# Patient Record
Sex: FEMALE | Race: WHITE | Employment: PART TIME | URBAN - METROPOLITAN AREA
[De-identification: names, ages, dates, MRNs, and addresses within clinical notes are randomized per-mention and may not be internally consistent; named-entity substitution may affect disease eponyms.]

---

## 2023-03-17 ENCOUNTER — HOSPITAL ENCOUNTER (EMERGENCY)
Age: 48
Discharge: HOME | End: 2023-03-17
Attending: EMERGENCY MEDICINE
Payer: COMMERCIAL

## 2023-03-17 ENCOUNTER — APPOINTMENT (OUTPATIENT)
Dept: CT IMAGING | Age: 48
End: 2023-03-17
Attending: EMERGENCY MEDICINE
Payer: COMMERCIAL

## 2023-03-17 VITALS
DIASTOLIC BLOOD PRESSURE: 68 MMHG | HEIGHT: 64 IN | TEMPERATURE: 98.1 F | BODY MASS INDEX: 23.9 KG/M2 | HEART RATE: 98 BPM | OXYGEN SATURATION: 98 % | SYSTOLIC BLOOD PRESSURE: 126 MMHG | WEIGHT: 140 LBS | RESPIRATION RATE: 18 BRPM

## 2023-03-17 DIAGNOSIS — N23 RENAL COLIC: ICD-10-CM

## 2023-03-17 DIAGNOSIS — N20.1 URETEROLITHIASIS: Primary | ICD-10-CM

## 2023-03-17 LAB
APPEARANCE UR: ABNORMAL
BACTERIA URNS QL MICRO: ABNORMAL /HPF
BILIRUB UR QL: NEGATIVE
CAOX CRY URNS QL MICRO: ABNORMAL
COLOR UR: ABNORMAL
EPITH CASTS URNS QL MICRO: ABNORMAL /LPF
GLUCOSE UR STRIP.AUTO-MCNC: NEGATIVE MG/DL
HGB UR QL STRIP: ABNORMAL
KETONES UR QL STRIP.AUTO: NEGATIVE MG/DL
LEUKOCYTE ESTERASE UR QL STRIP.AUTO: NEGATIVE
MUCOUS THREADS URNS QL MICRO: ABNORMAL /LPF
NITRITE UR QL STRIP.AUTO: NEGATIVE
PH UR STRIP: 6 (ref 5–8)
PROT UR STRIP-MCNC: ABNORMAL MG/DL
RBC #/AREA URNS HPF: ABNORMAL /HPF (ref 0–5)
SP GR UR REFRACTOMETRY: 1.02 (ref 1–1.03)
UROBILINOGEN UR QL STRIP.AUTO: 0.2 EU/DL (ref 0.2–1)
WBC URNS QL MICRO: ABNORMAL /HPF (ref 0–4)

## 2023-03-17 PROCEDURE — 74176 CT ABD & PELVIS W/O CONTRAST: CPT

## 2023-03-17 PROCEDURE — 81001 URINALYSIS AUTO W/SCOPE: CPT

## 2023-03-17 PROCEDURE — 96372 THER/PROPH/DIAG INJ SC/IM: CPT

## 2023-03-17 PROCEDURE — 74011250636 HC RX REV CODE- 250/636: Performed by: EMERGENCY MEDICINE

## 2023-03-17 PROCEDURE — 99284 EMERGENCY DEPT VISIT MOD MDM: CPT

## 2023-03-17 RX ORDER — ONDANSETRON 4 MG/1
4 TABLET, ORALLY DISINTEGRATING ORAL
Qty: 12 TABLET | Refills: 0 | Status: SHIPPED | OUTPATIENT
Start: 2023-03-17

## 2023-03-17 RX ORDER — HYDROCODONE BITARTRATE AND ACETAMINOPHEN 5; 325 MG/1; MG/1
1 TABLET ORAL
Qty: 8 TABLET | Refills: 0 | Status: SHIPPED | OUTPATIENT
Start: 2023-03-17 | End: 2023-03-20

## 2023-03-17 RX ORDER — TAMSULOSIN HYDROCHLORIDE 0.4 MG/1
0.4 CAPSULE ORAL DAILY
Qty: 15 CAPSULE | Refills: 0 | Status: SHIPPED | OUTPATIENT
Start: 2023-03-17 | End: 2023-04-01

## 2023-03-17 RX ORDER — KETOROLAC TROMETHAMINE 10 MG/1
10 TABLET, FILM COATED ORAL
Qty: 10 TABLET | Refills: 0 | Status: SHIPPED | OUTPATIENT
Start: 2023-03-17

## 2023-03-17 RX ORDER — ONDANSETRON 4 MG/1
4 TABLET, ORALLY DISINTEGRATING ORAL
Qty: 12 TABLET | Refills: 0 | Status: SHIPPED | OUTPATIENT
Start: 2023-03-17 | End: 2023-03-17 | Stop reason: SDUPTHER

## 2023-03-17 RX ORDER — KETOROLAC TROMETHAMINE 10 MG/1
10 TABLET, FILM COATED ORAL
Qty: 10 TABLET | Refills: 0 | Status: SHIPPED | OUTPATIENT
Start: 2023-03-17 | End: 2023-03-17 | Stop reason: SDUPTHER

## 2023-03-17 RX ORDER — TAMSULOSIN HYDROCHLORIDE 0.4 MG/1
0.4 CAPSULE ORAL DAILY
Qty: 15 CAPSULE | Refills: 0 | Status: SHIPPED | OUTPATIENT
Start: 2023-03-17 | End: 2023-03-17 | Stop reason: SDUPTHER

## 2023-03-17 RX ORDER — KETOROLAC TROMETHAMINE 30 MG/ML
60 INJECTION, SOLUTION INTRAMUSCULAR; INTRAVENOUS ONCE
Status: COMPLETED | OUTPATIENT
Start: 2023-03-17 | End: 2023-03-17

## 2023-03-17 RX ADMIN — KETOROLAC TROMETHAMINE 60 MG: 30 INJECTION, SOLUTION INTRAMUSCULAR at 23:43

## 2023-03-18 NOTE — ED PROVIDER NOTES
Date of Service:  3/17/2023    Patient:  Ernesto Hsieh    Chief Complaint:  Urinary Frequency       HPI:  Ernesto Hsieh is a 56 y.o.  female who presents for evaluation of urinary frequency. Patient states that she had frequency for the last several days with some intermittent left flank pain that seems to wax and wane. She is unable to see her primary care doctor. Pain got better after hot bath but then comes back. She has a feeling that she needs to urinate however she just urinated. No history of kidney stones but she does describe left back turned into flank pain. Well-appearing patient denying any acute need for pain medicine at this time. No past medical history on file. No past surgical history on file. No family history on file. Social History     Socioeconomic History    Marital status: Not on file     Spouse name: Not on file    Number of children: Not on file    Years of education: Not on file    Highest education level: Not on file   Occupational History    Not on file   Tobacco Use    Smoking status: Not on file    Smokeless tobacco: Not on file   Substance and Sexual Activity    Alcohol use: Not on file    Drug use: Not on file    Sexual activity: Not on file   Other Topics Concern    Not on file   Social History Narrative    Not on file     Social Determinants of Health     Financial Resource Strain: Not on file   Food Insecurity: Not on file   Transportation Needs: Not on file   Physical Activity: Not on file   Stress: Not on file   Social Connections: Not on file   Intimate Partner Violence: Not on file   Housing Stability: Not on file         ALLERGIES: Patient has no known allergies. Review of Systems   All other systems reviewed and are negative. There were no vitals filed for this visit. Physical Exam  Vitals and nursing note reviewed. Constitutional:       Appearance: Normal appearance. Cardiovascular:      Rate and Rhythm: Normal rate.    Pulmonary: Effort: Pulmonary effort is normal.   Abdominal:      General: Abdomen is flat. Tenderness: There is no abdominal tenderness. There is no right CVA tenderness, left CVA tenderness or guarding. Musculoskeletal:         General: No deformity. Skin:     General: Skin is warm. Neurological:      Mental Status: She is alert and oriented to person, place, and time. Psychiatric:         Mood and Affect: Mood normal.        Medical Decision Making  Amount and/or Complexity of Data Reviewed  Labs: ordered. Decision-making details documented in ED Course. Radiology: ordered. Risk  Prescription drug management. ED Course as of 03/18/23 0544   Fri Mar 17, 2023   2312 Blood(!): LARGE [GG]   2313 Independent evaluation of CT scan noncontrast reveals hydroureter on the left with distal stone [GG]   2313 Bacteria(!): 1+ [GG]   2313 WBC: 0-4 [GG]      ED Course User Index  [GG] Agapito Pole, DO     VITAL SIGNS:  No data found. LABS:  The Following labs have been ordered while in the emergency department and I have independently evaluated them. No results found for this or any previous visit (from the past 6 hour(s)). IMAGING:  The Following imaging studies have been ordered while in the emergency department and I have reviewed them. CT ABD PELV WO CONT   Final Result   4 mm stone left distal ureter resulting in mild left hydronephrosis. Bilateral   nonobstructing renal stones. Medications During Visit:  I ordered/approved the ordering of the following medicines for the patient while in the emergency department. Medications   ketorolac tromethamine (TORADOL) 60 mg/2 mL injection 60 mg (60 mg IntraMUSCular Given 3/17/23 6665)         DECISION MAKING:  Evon Santizo is a 56 y.o. female who comes in as above. Imaging and symptoms consistent with kidney stone/renal colic. Given other workup here, doubt other cause for her flank pain.  Will DC home with medications as below and follow up/return as discussed. IMPRESSION:  1. Ureterolithiasis    2. Renal colic        DISPOSITION:  Discharged      Discharge Medication List as of 3/17/2023 11:34 PM        START taking these medications    Details   HYDROcodone-acetaminophen (Norco) 5-325 mg per tablet Take 1 Tablet by mouth every four (4) hours as needed for Pain for up to 3 days. Max Daily Amount: 6 Tablets., Normal, Disp-8 Tablet, R-0      ondansetron (ZOFRAN ODT) 4 mg disintegrating tablet Take 1 Tablet by mouth every eight (8) hours as needed for Nausea for up to 12 doses. , Normal, Disp-12 Tablet, R-0      ketorolac (TORADOL) 10 mg tablet Take 1 Tablet by mouth every six (6) hours as needed for Pain., Normal, Disp-10 Tablet, R-0      tamsulosin (Flomax) 0.4 mg capsule Take 1 Capsule by mouth daily for 15 days. , Normal, Disp-15 Capsule, R-0              Follow-up Information       Follow up With Specialties Details Why Contact Info    Gerardo Hensley MD Family Medicine Schedule an appointment as soon as possible for a visit   [id number] [facility] [id number]  [facility] [address] Urology Schedule an appointment as soon as possible for a visit   [address]  [facility] [id number]              The patient is asked to follow-up with their primary care provider in the next several days. They are to call tomorrow for an appointment. The patient is asked to return promptly for any increased concerns or worsening of symptoms. They can return to this emergency department or any other emergency department.     Procedures

## 2023-03-18 NOTE — ED NOTES
The patient was discharged home by Dr Amber Ramos and Keren Lozoya in stable condition . The patient is alert and oriented, is in no respiratory distress and has vital signs within normal limits . The patient's diagnosis, condition and treatment were explained to patient or parent/guardian. The patient/responsible party expressed understanding. Prescriptions sent to pharmacy on file. No work/school note given to pt. A discharge plan has been developed. A  was not involved in the process. Aftercare instructions were given to the patient.

## 2023-03-22 ENCOUNTER — OFFICE VISIT (OUTPATIENT)
Dept: UROLOGY | Age: 48
End: 2023-03-22
Payer: COMMERCIAL

## 2023-03-22 VITALS — HEIGHT: 64 IN | WEIGHT: 140 LBS | BODY MASS INDEX: 23.9 KG/M2

## 2023-03-22 DIAGNOSIS — N20.1 CALCULUS OF URETER: Primary | ICD-10-CM

## 2023-03-22 DIAGNOSIS — R35.0 URINARY FREQUENCY: ICD-10-CM

## 2023-03-22 LAB
BILIRUB UR QL: NEGATIVE
GLUCOSE UR-MCNC: NEGATIVE MG/DL
KETONES P FAST UR STRIP-MCNC: NEGATIVE MG/DL
PH UR STRIP: 7.5 [PH] (ref 4.6–8)
PROT UR QL STRIP: NEGATIVE
SP GR UR STRIP: 1.02 (ref 1–1.03)
UA UROBILINOGEN AMB POC: NORMAL (ref 0.2–1)
URINALYSIS CLARITY POC: CLEAR
URINALYSIS COLOR POC: YELLOW
URINE BLOOD POC: NORMAL
URINE LEUKOCYTES POC: NEGATIVE
URINE NITRITES POC: NEGATIVE

## 2023-03-22 PROCEDURE — 99204 OFFICE O/P NEW MOD 45 MIN: CPT | Performed by: UROLOGY

## 2023-03-22 PROCEDURE — 81003 URINALYSIS AUTO W/O SCOPE: CPT | Performed by: UROLOGY

## 2023-03-22 RX ORDER — ASCORBIC ACID 250 MG
TABLET ORAL
COMMUNITY

## 2023-03-22 RX ORDER — ERGOCALCIFEROL 1.25 MG/1
50000 CAPSULE ORAL
COMMUNITY

## 2023-03-22 RX ORDER — CIDER VINEGAR 300 MG
TABLET ORAL
COMMUNITY

## 2023-03-22 RX ORDER — KETOROLAC TROMETHAMINE 10 MG/1
10 TABLET, FILM COATED ORAL
Qty: 15 TABLET | Refills: 0 | Status: SHIPPED | OUTPATIENT
Start: 2023-03-22

## 2023-03-22 RX ORDER — OMEGA-3-ACID ETHYL ESTERS 1 G/1
2 CAPSULE, LIQUID FILLED ORAL 2 TIMES DAILY
COMMUNITY

## 2023-03-22 NOTE — ASSESSMENT & PLAN NOTE
4 mm distal left ureteral stone seen on CT 3/17/2023. Her symptoms have resolved over the last day or so. She may have passed a stone or have persistent it may persist.  We discussed observation versus reimaging. She is okay to proceed she feels comfortable with observation. Toradol renewed. She can finish out her tamsulosin at this point.

## 2023-03-22 NOTE — PROGRESS NOTES
Chief Complaint   Patient presents with    New Patient    Kidney Stone     1. Have you been to the ER, urgent care clinic since your last visit? Hospitalized since your last visit? Yes Where: [facility]     2. Have you seen or consulted any other health care providers outside of the [address] [place] [address] since your last visit? Include any pap smears or colon screening.  No  Visit Vitals  Ht 5' 4\" (1.626 m)   Wt 140 lb (63.5 kg)   BMI 24.03 kg/m²

## 2023-03-22 NOTE — LETTER
3/22/2023    Patient: Elsa Calderon   YOB: 1975   Date of Visit: 3/22/2023     Sriram Broderick MD  [address] [facility] [phone]  Via Fax: [phone]    Dear Sriram Broderick MD,      Thank you for referring Ms. Elsa Calderon to [place] [address] for evaluation. My notes for this consultation are attached. If you have questions, please do not hesitate to call me. I look forward to following your patient along with you.       Sincerely,    Db Casey MD

## 2023-03-22 NOTE — PROGRESS NOTES
HISTORY OF PRESENT ILLNESS  Karthik Morillo is a 56 y.o. female. has a past medical history of Calculus of kidney. has a past surgical history that includes hx  section and hx hysterectomy. Chief Complaint   Patient presents with    New Patient    Kidney Natalie Jean     She presented to the emergency room 3/17/2023 with complaints of urinary frequency. She had a strong urge despite just having voided. It came on gradually for 1 to 2 days prior to presentation, 5 days ago. She denied dysuria. She had microhematuria. A CT revealed a 4 mm distal left ureteral stone. 4 days ago she had intermittent left back pain. She was taking ketorolac. Her pain was controlled. It went away yesterday morning. Her urinary frequency settled down 3 days ago. She has not been straining her urine. Chronic Conditions Addressed Today       1. Calculus of ureter - Primary     Current Assessment & Plan       4 mm distal left ureteral stone seen on CT 3/17/2023. Her symptoms have resolved over the last day or so. She may have passed a stone or have persistent it may persist.  We discussed observation versus reimaging. She is okay to proceed she feels comfortable with observation. Toradol renewed. She can finish out her tamsulosin at this point. Relevant Medications     ergocalciferol (Vitamin D2) 1,250 mcg (50,000 unit) capsule     Other Relevant Orders     AMB POC URINALYSIS DIP STICK AUTO W/O MICRO (Completed)    2. Urinary frequency     Current Assessment & Plan      Urinary frequency likely from 4 mm-distal left ureteral calculus. This has resolved. Relevant Orders     AMB POC URINALYSIS DIP STICK AUTO W/O MICRO (Completed)       Past Medical History:    PMHx (including negatives):  has a past medical history of Calculus of kidney. PSurgHx:  has a past surgical history that includes hx  section and hx hysterectomy. PSocHx:  reports that she has never smoked.  She has never used smokeless tobacco. She reports that she does not currently use alcohol. She reports that she does not use drugs. FamilyHX:   Family History   Problem Relation Age of Onset    Cancer Father     Cancer Paternal Grandmother      Review of Systems   All other systems reviewed and are negative. Physical Exam  Vitals reviewed. Constitutional:       General: She is not in acute distress. Appearance: Normal appearance. She is obese. She is not ill-appearing, toxic-appearing or diaphoretic. HENT:      Head: Normocephalic and atraumatic. Mouth/Throat:      Mouth: Mucous membranes are moist.      Pharynx: Oropharynx is clear. Eyes:      Extraocular Movements: Extraocular movements intact. Conjunctiva/sclera: Conjunctivae normal.      Pupils: Pupils are equal, round, and reactive to light. Cardiovascular:      Rate and Rhythm: Normal rate and regular rhythm. Pulmonary:      Effort: Pulmonary effort is normal. No tachypnea or respiratory distress. Breath sounds: Normal breath sounds. Abdominal:      General: Abdomen is flat. Bowel sounds are normal.      Palpations: Abdomen is soft. Tenderness: There is no right CVA tenderness or left CVA tenderness. Musculoskeletal:         General: No swelling or deformity. Normal range of motion. Cervical back: Normal range of motion. Lymphadenopathy:      Cervical: No cervical adenopathy. Upper Body:      Right upper body: No supraclavicular adenopathy. Left upper body: No supraclavicular adenopathy. Skin:     General: Skin is warm and dry. Coloration: Skin is not jaundiced. Findings: No rash. Neurological:      General: No focal deficit present. Mental Status: She is alert and oriented to person, place, and time.    Psychiatric:         Mood and Affect: Mood normal.         Behavior: Behavior normal.     No Known Allergies  Current Outpatient Medications   Medication Sig Dispense Refill    ascorbic acid, vitamin C, (Vitamin C) 250 mg tablet Take  by mouth.      ergocalciferol (Vitamin D2) 1,250 mcg (50,000 unit) capsule Take 50,000 Units by mouth. Apple Cider Vinegar 300 mg tab Take  by mouth. omega-3 acid ethyl esters (LOVAZA) 1 gram capsule Take 2 g by mouth two (2) times a day.      ketorolac (TORADOL) 10 mg tablet Take 1 Tablet by mouth every six (6) hours as needed for Pain. 15 Tablet 0    tamsulosin (Flomax) 0.4 mg capsule Take 1 Capsule by mouth daily for 15 days. 15 Capsule 0      Results for orders placed or performed in visit on 03/22/23   AMB POC URINALYSIS DIP STICK AUTO W/O MICRO   Result Value Ref Range    Color (UA POC) Yellow     Clarity (UA POC) Clear     Glucose (UA POC) Negative Negative mg/dL    Bilirubin (UA POC) Negative Negative    Ketones (UA POC) Negative Negative    Specific gravity (UA POC) 1.020 1.001 - 1.035    Blood (UA POC) Moderate Negative    pH (UA POC) 7.5 4.6 - 8.0    Protein (UA POC) Negative Negative    Urobilinogen (UA POC) 0.2 mg/dL 0.2 - 1    Nitrites (UA POC) Negative Negative    Leukocyte esterase (UA POC) Negative Negative     CT 3/17/2023: 4 mm distal left ureteral stone      ASSESSMENT and PLAN  Diagnoses and all orders for this visit:    1. Calculus of ureter  Assessment & Plan:   4 mm distal left ureteral stone seen on CT 3/17/2023. Her symptoms have resolved over the last day or so. She may have passed a stone or have persistent it may persist.  We discussed observation versus reimaging. She is okay to proceed she feels comfortable with observation. Toradol renewed. She can finish out her tamsulosin at this point. Orders:  -     AMB POC URINALYSIS DIP STICK AUTO W/O MICRO    2. Urinary frequency  Assessment & Plan:  Urinary frequency likely from 4 mm-distal left ureteral calculus. This has resolved. Orders:  -     AMB POC URINALYSIS DIP STICK AUTO W/O MICRO    Other orders  -     ketorolac (TORADOL) 10 mg tablet;  Take 1 Tablet by mouth every six (6) hours as needed for Pain. Wilner Hernandez MD       Please note that portions of this note was potentially completed with Dragon dictation, the computer voice recognition software. Quite often unanticipated grammatical, syntax, homophones, and other interpretive errors are inadvertently transcribed by the computer software. Please disregard these errors. Please excuse any errors that have escaped final proofreading. Thank you.